# Patient Record
Sex: FEMALE | Race: WHITE | NOT HISPANIC OR LATINO | URBAN - METROPOLITAN AREA
[De-identification: names, ages, dates, MRNs, and addresses within clinical notes are randomized per-mention and may not be internally consistent; named-entity substitution may affect disease eponyms.]

---

## 2020-02-25 ENCOUNTER — EMERGENCY (EMERGENCY)
Facility: HOSPITAL | Age: 65
LOS: 0 days | Discharge: HOME | End: 2020-02-25
Attending: EMERGENCY MEDICINE
Payer: MEDICARE

## 2020-02-25 VITALS
RESPIRATION RATE: 22 BRPM | HEIGHT: 64 IN | DIASTOLIC BLOOD PRESSURE: 86 MMHG | WEIGHT: 130.07 LBS | OXYGEN SATURATION: 96 % | SYSTOLIC BLOOD PRESSURE: 161 MMHG | HEART RATE: 78 BPM | TEMPERATURE: 98 F

## 2020-02-25 VITALS — HEART RATE: 65 BPM | DIASTOLIC BLOOD PRESSURE: 62 MMHG | SYSTOLIC BLOOD PRESSURE: 132 MMHG

## 2020-02-25 DIAGNOSIS — I45.19 OTHER RIGHT BUNDLE-BRANCH BLOCK: ICD-10-CM

## 2020-02-25 DIAGNOSIS — R42 DIZZINESS AND GIDDINESS: ICD-10-CM

## 2020-02-25 LAB
ALBUMIN SERPL ELPH-MCNC: 3.4 G/DL — SIGNIFICANT CHANGE UP (ref 3.3–5)
ALP SERPL-CCNC: 98 U/L — SIGNIFICANT CHANGE UP (ref 40–120)
ALT FLD-CCNC: 21 U/L — SIGNIFICANT CHANGE UP (ref 12–78)
ANION GAP SERPL CALC-SCNC: 6 MMOL/L — SIGNIFICANT CHANGE UP (ref 5–17)
AST SERPL-CCNC: 20 U/L — SIGNIFICANT CHANGE UP (ref 15–37)
BILIRUB SERPL-MCNC: 0.3 MG/DL — SIGNIFICANT CHANGE UP (ref 0.2–1.2)
BUN SERPL-MCNC: 14 MG/DL — SIGNIFICANT CHANGE UP (ref 7–23)
CALCIUM SERPL-MCNC: 8.9 MG/DL — SIGNIFICANT CHANGE UP (ref 8.5–10.1)
CHLORIDE SERPL-SCNC: 113 MMOL/L — HIGH (ref 96–108)
CO2 SERPL-SCNC: 23 MMOL/L — SIGNIFICANT CHANGE UP (ref 22–31)
CREAT SERPL-MCNC: 0.97 MG/DL — SIGNIFICANT CHANGE UP (ref 0.5–1.3)
GLUCOSE SERPL-MCNC: 97 MG/DL — SIGNIFICANT CHANGE UP (ref 70–99)
HCT VFR BLD CALC: 41.9 % — SIGNIFICANT CHANGE UP (ref 34.5–45)
HGB BLD-MCNC: 13.5 G/DL — SIGNIFICANT CHANGE UP (ref 11.5–15.5)
MCHC RBC-ENTMCNC: 28.3 PG — SIGNIFICANT CHANGE UP (ref 27–34)
MCHC RBC-ENTMCNC: 32.2 GM/DL — SIGNIFICANT CHANGE UP (ref 32–36)
MCV RBC AUTO: 87.8 FL — SIGNIFICANT CHANGE UP (ref 80–100)
PLATELET # BLD AUTO: 196 K/UL — SIGNIFICANT CHANGE UP (ref 150–400)
POTASSIUM SERPL-MCNC: 4.1 MMOL/L — SIGNIFICANT CHANGE UP (ref 3.5–5.3)
POTASSIUM SERPL-SCNC: 4.1 MMOL/L — SIGNIFICANT CHANGE UP (ref 3.5–5.3)
PROT SERPL-MCNC: 7.4 GM/DL — SIGNIFICANT CHANGE UP (ref 6–8.3)
RBC # BLD: 4.77 M/UL — SIGNIFICANT CHANGE UP (ref 3.8–5.2)
RBC # FLD: 12.3 % — SIGNIFICANT CHANGE UP (ref 10.3–14.5)
SODIUM SERPL-SCNC: 142 MMOL/L — SIGNIFICANT CHANGE UP (ref 135–145)
TROPONIN I SERPL-MCNC: <0.015 NG/ML — SIGNIFICANT CHANGE UP (ref 0.01–0.04)
WBC # BLD: 8.09 K/UL — SIGNIFICANT CHANGE UP (ref 3.8–10.5)
WBC # FLD AUTO: 8.09 K/UL — SIGNIFICANT CHANGE UP (ref 3.8–10.5)

## 2020-02-25 PROCEDURE — 93005 ELECTROCARDIOGRAM TRACING: CPT

## 2020-02-25 PROCEDURE — 96360 HYDRATION IV INFUSION INIT: CPT

## 2020-02-25 PROCEDURE — 80053 COMPREHEN METABOLIC PANEL: CPT

## 2020-02-25 PROCEDURE — 84484 ASSAY OF TROPONIN QUANT: CPT

## 2020-02-25 PROCEDURE — 99284 EMERGENCY DEPT VISIT MOD MDM: CPT | Mod: 25

## 2020-02-25 PROCEDURE — 71045 X-RAY EXAM CHEST 1 VIEW: CPT | Mod: 26

## 2020-02-25 PROCEDURE — 93010 ELECTROCARDIOGRAM REPORT: CPT

## 2020-02-25 PROCEDURE — 85027 COMPLETE CBC AUTOMATED: CPT

## 2020-02-25 PROCEDURE — 36415 COLL VENOUS BLD VENIPUNCTURE: CPT

## 2020-02-25 PROCEDURE — 96361 HYDRATE IV INFUSION ADD-ON: CPT

## 2020-02-25 PROCEDURE — 71045 X-RAY EXAM CHEST 1 VIEW: CPT

## 2020-02-25 PROCEDURE — 99284 EMERGENCY DEPT VISIT MOD MDM: CPT

## 2020-02-25 RX ORDER — SODIUM CHLORIDE 9 MG/ML
1000 INJECTION INTRAMUSCULAR; INTRAVENOUS; SUBCUTANEOUS ONCE
Refills: 0 | Status: COMPLETED | OUTPATIENT
Start: 2020-02-25 | End: 2020-02-25

## 2020-02-25 RX ADMIN — SODIUM CHLORIDE 1000 MILLILITER(S): 9 INJECTION INTRAMUSCULAR; INTRAVENOUS; SUBCUTANEOUS at 10:55

## 2020-02-25 RX ADMIN — SODIUM CHLORIDE 1000 MILLILITER(S): 9 INJECTION INTRAMUSCULAR; INTRAVENOUS; SUBCUTANEOUS at 09:21

## 2020-02-25 NOTE — ED ADULT TRIAGE NOTE - CHIEF COMPLAINT QUOTE
pt a/ox3, BIBEMS from home as per EMS "someone called from pt house for pt feeling like the room is spinning and head is spinning. pt then states my heart is pounding. pt recently had a fight with someone in her family so her family thinks its stress from that". pt denies CP/sob, fever, chills, N/V/D.

## 2020-02-25 NOTE — ED PROVIDER NOTE - NEUROLOGICAL, MLM
Alert and oriented, no focal deficits, no motor or sensory deficits. Finger to nose normal. Ambulating with steady gait.

## 2020-02-25 NOTE — ED PROVIDER NOTE - OBJECTIVE STATEMENT
65 y/o female with no significant PMHx presents to the ED c/o dizziness x10 minutes this morning. Pt states she woke up this morning and felt like the room was spinning. Dizziness subsided 10 minutes after. Denies n/v, CP, SOB, numbness, tingling. Pt currently asymptomatic. No other complaints at this time.

## 2020-02-25 NOTE — ED PROVIDER NOTE - PATIENT PORTAL LINK FT
You can access the FollowMyHealth Patient Portal offered by [facility] by registering at the following website: [email or web address]. By joining [employer]’s FollowMyHealth portal, you will also be able to view your health information using other applications (apps) compatible with our system.

## 2020-02-25 NOTE — ED PROVIDER NOTE - CLINICAL SUMMARY MEDICAL DECISION MAKING FREE TEXT BOX
Possibly vertigo vs presyncopal episode. Given no neuro deficits, likely peripheral cause of vertigo. No central cause as pt has a benign exam and is currently asymptomatic. EKG nonischemic and with no evidence of underlying arrhythmia. Given no CP, pt evaluated with single troponin. Possibly vertigo vs presyncopal episode. Given no neuro deficits, likely peripheral cause of vertigo. Do not suspect central neuro etiology given symptom free and never with focal deficits.  Negative test of skew.  Pt remained asymptomatic throughout ed stay.  Denies cp/palpitations/sob.  Does not appear to have been syncopal event given timing that patient symptomatic.  EKG is unremarkable.    EKG nonischemic and with no evidence of underlying arrhythmia. Given no CP, pt evaluated with single troponin.  Pt would like to go home.  Stable for d/c at this time.  D/c home with strict return precautions and prompt outpatient f/u.

## 2020-02-25 NOTE — ED ADULT NURSE NOTE - OBJECTIVE STATEMENT
pt presents to ed via ems for dizziness since this am with associated "heart pounding "as per family, pt had argument with family member last night and thinks she is stressed from it. pt denies chest pain denies sob, talking in full sentences. denies dizziness at this time. ekg done

## 2021-01-28 ENCOUNTER — FORM ENCOUNTER (OUTPATIENT)
Age: 66
End: 2021-01-28

## 2021-01-29 ENCOUNTER — TRANSCRIPTION ENCOUNTER (OUTPATIENT)
Age: 66
End: 2021-01-29

## 2021-01-30 PROBLEM — Z00.00 ENCOUNTER FOR PREVENTIVE HEALTH EXAMINATION: Status: ACTIVE | Noted: 2021-01-30

## 2021-01-31 ENCOUNTER — TRANSCRIPTION ENCOUNTER (OUTPATIENT)
Age: 66
End: 2021-01-31

## 2021-01-31 ENCOUNTER — EMERGENCY (EMERGENCY)
Facility: HOSPITAL | Age: 66
LOS: 0 days | Discharge: HOME | End: 2021-01-31
Attending: EMERGENCY MEDICINE
Payer: MEDICARE

## 2021-01-31 VITALS
DIASTOLIC BLOOD PRESSURE: 79 MMHG | TEMPERATURE: 98 F | RESPIRATION RATE: 24 BRPM | SYSTOLIC BLOOD PRESSURE: 135 MMHG | OXYGEN SATURATION: 96 % | HEART RATE: 92 BPM

## 2021-01-31 VITALS — WEIGHT: 145.06 LBS | HEIGHT: 64 IN

## 2021-01-31 DIAGNOSIS — I45.10 UNSPECIFIED RIGHT BUNDLE-BRANCH BLOCK: ICD-10-CM

## 2021-01-31 DIAGNOSIS — B33.8 OTHER SPECIFIED VIRAL DISEASES: ICD-10-CM

## 2021-01-31 DIAGNOSIS — R05 COUGH: ICD-10-CM

## 2021-01-31 DIAGNOSIS — U07.1 COVID-19: ICD-10-CM

## 2021-01-31 DIAGNOSIS — R07.89 OTHER CHEST PAIN: ICD-10-CM

## 2021-01-31 DIAGNOSIS — R53.83 OTHER FATIGUE: ICD-10-CM

## 2021-01-31 DIAGNOSIS — R00.0 TACHYCARDIA, UNSPECIFIED: ICD-10-CM

## 2021-01-31 DIAGNOSIS — R06.02 SHORTNESS OF BREATH: ICD-10-CM

## 2021-01-31 LAB
ALBUMIN SERPL ELPH-MCNC: 3.2 G/DL — LOW (ref 3.3–5)
ALP SERPL-CCNC: 107 U/L — SIGNIFICANT CHANGE UP (ref 40–120)
ALT FLD-CCNC: 93 U/L — HIGH (ref 12–78)
ANION GAP SERPL CALC-SCNC: 5 MMOL/L — SIGNIFICANT CHANGE UP (ref 5–17)
AST SERPL-CCNC: 90 U/L — HIGH (ref 15–37)
BASOPHILS # BLD AUTO: 0.01 K/UL — SIGNIFICANT CHANGE UP (ref 0–0.2)
BASOPHILS NFR BLD AUTO: 0.2 % — SIGNIFICANT CHANGE UP (ref 0–2)
BILIRUB SERPL-MCNC: 0.4 MG/DL — SIGNIFICANT CHANGE UP (ref 0.2–1.2)
BUN SERPL-MCNC: 11 MG/DL — SIGNIFICANT CHANGE UP (ref 7–23)
CALCIUM SERPL-MCNC: 8.9 MG/DL — SIGNIFICANT CHANGE UP (ref 8.5–10.1)
CHLORIDE SERPL-SCNC: 107 MMOL/L — SIGNIFICANT CHANGE UP (ref 96–108)
CO2 SERPL-SCNC: 26 MMOL/L — SIGNIFICANT CHANGE UP (ref 22–31)
CREAT SERPL-MCNC: 0.83 MG/DL — SIGNIFICANT CHANGE UP (ref 0.5–1.3)
D DIMER BLD IA.RAPID-MCNC: <150 NG/ML DDU — SIGNIFICANT CHANGE UP
EOSINOPHIL # BLD AUTO: 0 K/UL — SIGNIFICANT CHANGE UP (ref 0–0.5)
EOSINOPHIL NFR BLD AUTO: 0 % — SIGNIFICANT CHANGE UP (ref 0–6)
FERRITIN SERPL-MCNC: 577 NG/ML — HIGH (ref 15–150)
GLUCOSE SERPL-MCNC: 126 MG/DL — HIGH (ref 70–99)
HCT VFR BLD CALC: 41.8 % — SIGNIFICANT CHANGE UP (ref 34.5–45)
HGB BLD-MCNC: 13.6 G/DL — SIGNIFICANT CHANGE UP (ref 11.5–15.5)
IMM GRANULOCYTES NFR BLD AUTO: 0.3 % — SIGNIFICANT CHANGE UP (ref 0–1.5)
LYMPHOCYTES # BLD AUTO: 0.58 K/UL — LOW (ref 1–3.3)
LYMPHOCYTES # BLD AUTO: 10.1 % — LOW (ref 13–44)
MCHC RBC-ENTMCNC: 28 PG — SIGNIFICANT CHANGE UP (ref 27–34)
MCHC RBC-ENTMCNC: 32.5 GM/DL — SIGNIFICANT CHANGE UP (ref 32–36)
MCV RBC AUTO: 86 FL — SIGNIFICANT CHANGE UP (ref 80–100)
MONOCYTES # BLD AUTO: 0.22 K/UL — SIGNIFICANT CHANGE UP (ref 0–0.9)
MONOCYTES NFR BLD AUTO: 3.8 % — SIGNIFICANT CHANGE UP (ref 2–14)
NEUTROPHILS # BLD AUTO: 4.92 K/UL — SIGNIFICANT CHANGE UP (ref 1.8–7.4)
NEUTROPHILS NFR BLD AUTO: 85.6 % — HIGH (ref 43–77)
PLATELET # BLD AUTO: 143 K/UL — LOW (ref 150–400)
POTASSIUM SERPL-MCNC: 3.9 MMOL/L — SIGNIFICANT CHANGE UP (ref 3.5–5.3)
POTASSIUM SERPL-SCNC: 3.9 MMOL/L — SIGNIFICANT CHANGE UP (ref 3.5–5.3)
PROT SERPL-MCNC: 8 GM/DL — SIGNIFICANT CHANGE UP (ref 6–8.3)
RBC # BLD: 4.86 M/UL — SIGNIFICANT CHANGE UP (ref 3.8–5.2)
RBC # FLD: 12.9 % — SIGNIFICANT CHANGE UP (ref 10.3–14.5)
SODIUM SERPL-SCNC: 138 MMOL/L — SIGNIFICANT CHANGE UP (ref 135–145)
TROPONIN I SERPL-MCNC: <0.015 NG/ML — SIGNIFICANT CHANGE UP (ref 0.01–0.04)
WBC # BLD: 5.75 K/UL — SIGNIFICANT CHANGE UP (ref 3.8–10.5)
WBC # FLD AUTO: 5.75 K/UL — SIGNIFICANT CHANGE UP (ref 3.8–10.5)

## 2021-01-31 PROCEDURE — 85025 COMPLETE CBC W/AUTO DIFF WBC: CPT

## 2021-01-31 PROCEDURE — 85379 FIBRIN DEGRADATION QUANT: CPT

## 2021-01-31 PROCEDURE — 84484 ASSAY OF TROPONIN QUANT: CPT

## 2021-01-31 PROCEDURE — 99285 EMERGENCY DEPT VISIT HI MDM: CPT | Mod: 25

## 2021-01-31 PROCEDURE — 71045 X-RAY EXAM CHEST 1 VIEW: CPT

## 2021-01-31 PROCEDURE — 71045 X-RAY EXAM CHEST 1 VIEW: CPT | Mod: 26

## 2021-01-31 PROCEDURE — 86140 C-REACTIVE PROTEIN: CPT

## 2021-01-31 PROCEDURE — 96375 TX/PRO/DX INJ NEW DRUG ADDON: CPT

## 2021-01-31 PROCEDURE — 99285 EMERGENCY DEPT VISIT HI MDM: CPT | Mod: CS

## 2021-01-31 PROCEDURE — 93010 ELECTROCARDIOGRAM REPORT: CPT

## 2021-01-31 PROCEDURE — 93005 ELECTROCARDIOGRAM TRACING: CPT

## 2021-01-31 PROCEDURE — 96374 THER/PROPH/DIAG INJ IV PUSH: CPT

## 2021-01-31 PROCEDURE — 36415 COLL VENOUS BLD VENIPUNCTURE: CPT

## 2021-01-31 PROCEDURE — 96361 HYDRATE IV INFUSION ADD-ON: CPT

## 2021-01-31 PROCEDURE — 82728 ASSAY OF FERRITIN: CPT

## 2021-01-31 PROCEDURE — 80053 COMPREHEN METABOLIC PANEL: CPT

## 2021-01-31 PROCEDURE — 84145 PROCALCITONIN (PCT): CPT

## 2021-01-31 PROCEDURE — 94640 AIRWAY INHALATION TREATMENT: CPT

## 2021-01-31 RX ORDER — HYDROCODONE BITARTRATE AND HOMATROPINE METHYLBROMIDE 5; 1.5 MG/5ML; MG/5ML
5 SOLUTION ORAL
Qty: 105 | Refills: 0
Start: 2021-01-31 | End: 2021-02-06

## 2021-01-31 RX ORDER — DEXAMETHASONE 0.5 MG/5ML
6 ELIXIR ORAL ONCE
Refills: 0 | Status: COMPLETED | OUTPATIENT
Start: 2021-01-31 | End: 2021-01-31

## 2021-01-31 RX ORDER — SODIUM CHLORIDE 9 MG/ML
500 INJECTION INTRAMUSCULAR; INTRAVENOUS; SUBCUTANEOUS ONCE
Refills: 0 | Status: COMPLETED | OUTPATIENT
Start: 2021-01-31 | End: 2021-01-31

## 2021-01-31 RX ORDER — ALBUTEROL 90 UG/1
1 AEROSOL, METERED ORAL ONCE
Refills: 0 | Status: COMPLETED | OUTPATIENT
Start: 2021-01-31 | End: 2021-01-31

## 2021-01-31 RX ORDER — ONDANSETRON 8 MG/1
4 TABLET, FILM COATED ORAL ONCE
Refills: 0 | Status: COMPLETED | OUTPATIENT
Start: 2021-01-31 | End: 2021-01-31

## 2021-01-31 RX ORDER — ACETAMINOPHEN 500 MG
1000 TABLET ORAL ONCE
Refills: 0 | Status: COMPLETED | OUTPATIENT
Start: 2021-01-31 | End: 2021-01-31

## 2021-01-31 RX ADMIN — ALBUTEROL 1 PUFF(S): 90 AEROSOL, METERED ORAL at 19:46

## 2021-01-31 RX ADMIN — Medication 1000 MILLIGRAM(S): at 19:46

## 2021-01-31 RX ADMIN — Medication 6 MILLIGRAM(S): at 19:46

## 2021-01-31 RX ADMIN — ONDANSETRON 4 MILLIGRAM(S): 8 TABLET, FILM COATED ORAL at 17:52

## 2021-01-31 RX ADMIN — SODIUM CHLORIDE 500 MILLILITER(S): 9 INJECTION INTRAMUSCULAR; INTRAVENOUS; SUBCUTANEOUS at 18:52

## 2021-01-31 RX ADMIN — SODIUM CHLORIDE 500 MILLILITER(S): 9 INJECTION INTRAMUSCULAR; INTRAVENOUS; SUBCUTANEOUS at 17:52

## 2021-01-31 NOTE — ED ADULT TRIAGE NOTE - CHIEF COMPLAINT QUOTE
Pt reports +covid dx with increasing pain and SOB with exertion. EKG in progress.  Pt speaks Ukrainian, but reports understanding English and prefers that to  phone. Pt reports +covid dx with increasing pain and SOB with exertion. EKG in progress.  Pt speaks Kinyarwanda, but reports understanding English and prefers that to  phone.  Son Hanny Lujan [phone], [phone]

## 2021-01-31 NOTE — ED PROVIDER NOTE - CARE PLAN
Principal Discharge DX:	COVID-19  Secondary Diagnosis:	Viral syndrome  Secondary Diagnosis:	Chest tightness  Secondary Diagnosis:	SOB (shortness of breath)

## 2021-01-31 NOTE — ED PROVIDER NOTE - CLINICAL SUMMARY MEDICAL DECISION MAKING FREE TEXT BOX
COVID x10 days. O2 sat at rest 98%, with exertion 97%. Will DC with strict return precautions. COVID x10 days. with chest tightness, cough, fatigue, sob. O2 sat at rest 98%, with exertion 97%. labs & cxr non actionable. Will DC with strict return precautions. pt has pulse ox at home. son also updated on results.

## 2021-01-31 NOTE — ED ADULT NURSE NOTE - PLAN OF CARE DISCUSSED WITH:
Next 5 appointments (look out 90 days)    Jul 25, 2019  7:30 AM CDT  PHYSICAL with AYANNA Bowser CNP  [facility] [address] [place] [id number]  [phone]   Oct 14, 2019  7:30 AM CDT  Return Visit with Kenny Arcos MD  [facility]) [address] [place] [id number]  [phone]           Patient

## 2021-01-31 NOTE — ED ADULT NURSE NOTE - ED CARDIAC RATE
[facility]  [address] [place] [address]  Tom KIDD [id number]  Phone: [phone]      August 19, 2019      [facility] Geovanna [facility]  [address] [facility] [address]      Parents of Geovanna,    Monitoring and managing your preventative and chronic health conditions are very important to us. Our records indicate that you are due for a well child check and immunization update.    If you have received your health care elsewhere, please call the clinic so the information can be documented in your chart.    Please call [phone] or message us through your [employer] account to schedule an appointment or provide information for your chart.     Feel free to contact us if you have any questions or concerns!    I look forward to seeing you and working with you on your health care needs.     Sincerely,       [facility]/ SUSAN          *If you have already scheduled an appointment, please disregard this reminder     tachycardic

## 2021-01-31 NOTE — ED PROVIDER NOTE - PROGRESS NOTE DETAILS
neelima: PT seen and reassessed.  Patient symptomatically improved.  Wants to be discharged AAOX3, NAD, VSS.  Discussed test results w/ patient. Patient verbalized understanding of hospital course and outpatient plans, has decisional making capacity.  Will f/u w/ pmd in the next few days; patient will call for an appointment. Will return to the ED if there is any worsening of symptoms.  Patient able to ambulate at baseline, is tolerating PO intake. Has safe way of getting home.

## 2021-01-31 NOTE — ED ADULT NURSE NOTE - CHIEF COMPLAINT QUOTE
Pt reports +covid dx with increasing pain and SOB with exertion. EKG in progress.  Pt speaks Romanian, but reports understanding English and prefers that to  phone.  Son Hanny Lujan [phone], [phone]

## 2021-01-31 NOTE — ED STATDOCS - PROGRESS NOTE DETAILS
Yaneth DO: Patient with known covid; sent from PMD for low o2 sat at office; patient with sob, chest pain, tachycardic; will send to ED for evaluation at this time.

## 2021-01-31 NOTE — ED ADULT TRIAGE NOTE - LANGUAGE ASSISTANCE NEEDED
pt prefers to speak english to triage rn/No-Patient/Caregiver offered and refused free interpretation services.

## 2021-01-31 NOTE — ED ADULT NURSE NOTE - OBJECTIVE STATEMENT
Patient complains of worsening shortness of breath and chest pain after testing positive for the covid ten days prior.  EKG done on arrival.   Cardiac and VS monitoring initiated.   20g PIV placed in RAC.

## 2021-01-31 NOTE — ED PROVIDER NOTE - NS ED ROS FT
Review of Systems:  	•	CONSTITUTIONAL: no fever  	•	SKIN: no rash  	•	RESPIRATORY: no shortness of breath  	•	CARDIAC: no chest pain  	•	GI:  no abd pain, no nausea, no vomiting, no diarrhea  	•	GENITO-URINARY:  no dysuria  	•	MUSCULOSKELETAL:  no back pain  	•	NEUROLOGIC: no weakness  	•	ALLERGY: no rhinorrhea  	•	PSYSCHIATRIC: appropriate concern about symptoms Review of Systems:  	•	CONSTITUTIONAL: no fever  	•	SKIN: no rash  	•	RESPIRATORY: +shortness of breath, +cough  	•	CARDIAC: +chest congestion, tightness  	•	GI:  no abd pain, no nausea, no vomiting, no diarrhea  	•	GENITO-URINARY:  no dysuria  	•	MUSCULOSKELETAL:  no back pain  	•	NEUROLOGIC: no weakness  	•	ALLERGY: no rhinorrhea  	•	PSYSCHIATRIC: appropriate concern about symptoms Review of Systems:  	•	CONSTITUTIONAL: fever  	•	SKIN: no rash  	•	RESPIRATORY: +shortness of breath, +cough  	•	CARDIAC: +chest congestion, tightness  	•	GI:  no abd pain, no nausea, no vomiting, no diarrhea  	•	GENITO-URINARY:  no dysuria  	•	MUSCULOSKELETAL:  no back pain  	•	NEUROLOGIC: no weakness  	•	ALLERGY: no rhinorrhea  	•	PSYSCHIATRIC: appropriate concern about symptoms

## 2021-01-31 NOTE — ED PROVIDER NOTE - OBJECTIVE STATEMENT
Pertinent HPI/PMH/PSH/FHx/SHx and Review of Systems contained within  HPI:  Patient p/w CC   x  days, new onset vs acute on chronic.   PMH/PSH relevant for: RBBB, COVID +  ROS negative for: fever, Chest pain, SOB, Nausea, vomiting, diarrhea, abdominal pain, dysuria    FamilyHx and SocialHx not otherwise contributory Pertinent HPI/PMH/PSH/FHx/SHx and Review of Systems contained within  HPI:  Patient p/w CC worsening chest congestion/tightness, SOB, cough, x10 days. Pt is COVID +. Home pulse ox showed O2 saturation 93-94%, called PMD, who recommended coming to ED for further evaluation. Pt speaks Faroese, which Dr. Gonzalez was able to translate.   PMH/PSH relevant for: RBBB, HTN, hypothyroidism   ROS negative for: fever, Nausea, vomiting, diarrhea, abdominal pain, dysuria    FamilyHx and SocialHx not otherwise contributory I,prasanth ortez, can speak Telugu fluently & served as interpretor for pt   HPI:  Patient p/w CC worsening chest congestion/tightness, cough, x10 days. also with mild sob & decreased appetitie. Pt is COVID +. Home pulse ox showed O2 saturation 93-94%, called PMD, who recommended coming to ED for further evaluation.   PMH/PSH relevant for: RBBB, HTN, hypothyroidism   ROS negative for: Nausea, vomiting, diarrhea, abdominal pain, dysuria    FamilyHx and SocialHx not otherwise contributory

## 2021-01-31 NOTE — ED PROVIDER NOTE - PATIENT PORTAL LINK FT
You can access the FollowMyHealth Patient Portal offered by [facility] by registering at the following website: [email or web address]. By joining [employer]’s FollowMyHealth portal, you will also be able to view your health information using other applications (apps) compatible with our system.

## 2021-01-31 NOTE — ED PROVIDER NOTE - NSFOLLOWUPINSTRUCTIONS_ED_ALL_ED_FT
For monoclonal antibody infusion inquiry, call [phone] or visit [email or web address] for outpatient treatment.     What is COVID-19?  Coronavirus disease 2019, or "COVID-19," is an infection caused by a specific virus called SARS-CoV-2. The virus first appeared in late 2019 in the city of [place], [place]. But it has spread quickly since then, and there are now cases in many other places, including [place] and the [place].    People with COVID-19 can have fever, cough, and trouble breathing. Problems with breathing happen when the infection affects the lungs and causes pneumonia (figure 1).    Experts are studying this virus and will continue to learn more about it over time.    How is COVID-19 spread?  COVID-19 mainly spreads from person to person, similar to the flu. This usually happens when a sick person coughs or sneezes near other people. Doctors also think it is possible to get sick if you touch a surface that has the virus on it and then touch your mouth, nose, or eyes.    From what experts know so far, COVID-19 seems to spread most easily when people are showing symptoms. It is possible to spread it without having symptoms, too, but experts don't know how often this happens.    What are the symptoms of COVID-19?  Symptoms usually start a few days after a person is infected with the virus. But in some people it can take even longer for symptoms to appear.    Symptoms can include:    ?Fever    ?Cough    ?Feeling tired    ?Trouble breathing    ?Muscle aches    Although it is less common, some people have other symptoms, such as headache, sore throat, runny nose, or problems with their sense of smell. Some have digestive problems like nausea or diarrhea.    Most people have mild symptoms. Some people have no symptoms at all. But in other people, COVID-19 can lead to serious problems like pneumonia, not getting enough oxygen, heart problems, or even death. This is more common in people who are older or have other health problems like heart disease, diabetes, lung disease, or cancer.    While children can get COVID-19, they seem less likely to have severe symptoms.    Should I see a doctor or nurse?  If you have a fever, cough, or trouble breathing and might have been exposed to COVID-19, call your doctor or nurse. You might have been exposed if any of the following happened within the last 14 days:    ?You had close contact with a person who has the virus – This generally means being within about 6 feet of the person.    ?You lived in, or traveled to, an area where lots of people have the virus – The [place] Centers for Disease Control and Prevention (CDC) has information about which areas are affected. This can be found on their website: www.cdc.gov/coronavirus/2019-ncov/travelers/index.html.    If your symptoms are not severe, it is best to call your doctor, nurse, or clinic before you go in. They can tell you what to do and whether you need to be seen in person. Many people with only mild symptoms can stay home, and away from other people, until they get better. If you do need to go to the clinic or hospital, you will need to put on a face mask. The staff might also have you wait someplace away from other people.    If you are severely ill and need to go to the clinic or hospital right away, you should still call ahead. This way the staff can care for you while taking steps to protect others.    Your doctor or nurse will do an exam and ask about your symptoms. They will also ask questions about any recent travel and whether you have been around anyone who might be sick.    Will I need tests?  If your doctor or nurse suspects you have COVID-19, they might take a sample of fluid from inside your nose, and possibly your mouth, and send it to a lab for testing. If you are coughing up mucus, they might also test a sample of the mucus. These tests can show if you have COVID-19 or another infection.    In some areas, it might not be possible to test everyone who might have been exposed to the virus. If your doctor cannot test you, they might tell you to stay home and away from other people, and call if your symptoms get worse.    Your doctor might also order a chest X-ray or computed tomography (CT) scan to check your lungs.    How is COVID-19 treated?  There is no specific treatment for COVID-19. Many people will be able to stay home while they get better, but people with serious symptoms or other health problems might need to go to the hospital:    ?Mild illness – Most people with COVID-19 have only mild illness and can rest at home until they get better. People with mild symptoms seem to get better after about 2 weeks, but it's not the same for everyone.    If you are recovering from COVID-19, it's important to stay home, and away from other people, until your doctor or nurse tells you it's safe to go back to your normal activities. This decision will depend on how long it has been since you had symptoms, and in some cases, whether you have had a negative test (showing that the virus is no longer in your body).    ?Severe illness – If you have more severe illness, you might need to stay in the hospital, possibly in the intensive care unit (also called the "ICU"). While you are there, you will most likely be in a special "isolation" room. Only medical staff will be allowed in the room, and they will have to wear special gowns, gloves, masks, and eye protection.    The doctors and nurses can monitor and support your breathing and other body functions and make you as comfortable as possible. You might need extra oxygen to help you breathe easily. If you are having a very hard time breathing, you might need to be put on a ventilator. This is a machine to help you breathe.    Doctors are studying several different medicines to learn whether they might work to treat COVID-19. In certain cases, doctors might recommend these medicines.    Can COVID-19 be prevented?  There are things you can do to reduce your chances of getting COVID-19. These steps are a good idea for everyone, especially as the infection is spreading very quickly. But they are extra important for people age 65 years or older or who have other health problems. To help slow the spread of infection:    ?Wash your hands with soap and water often. This is especially important after being in public and touching other people or surfaces. Make sure to rub your hands with soap for at least 20 seconds, cleaning your wrists, fingernails, and in between your fingers. Then rinse your hands and dry them with a paper towel you can throw away.    If you are not near a sink, you can use a hand gel to clean your hands. The gels with at least 60 percent alcohol work the best. But it is better to wash with soap and water if you can.    ?Avoid touching your face with your hands, especially your mouth, nose, or eyes.    ?Try to stay away from people who have any symptoms of the infection.    ?Avoid crowds. If you live in an area where there have been cases of COVID-19, try to stay home as much as you can.    Even if you are healthy, limiting contact with other people can help slow the spread of disease. Experts call this "social distancing." In general, the recommendation is to cancel or postpone large gatherings such as sports events, concerts, festivals, parades, and weddings. But even smaller gatherings can be risky. If you do need to be around other people, be sure to wash your hands often and avoid contact when you can. For example, you can avoid handshakes and high fives, and encourage others to do the same.    ?Some experts recommend avoiding travel to certain countries where there are a lot of cases of COVID-19. Travel recommendations are changing often.    Experts do not recommend wearing a face mask if you are not sick, unless you are caring for someone who has (or might have) COVID-19.    If someone in your home has COVID-19, there are additional things you can do to protect yourself and others:    ?Keep the sick person away from others – The sick person should stay in a separate room and use a separate bathroom if possible. They should also eat in their own room.    ?Use face masks – The sick person should wear a face mask when they are in the same room as other people. If you are caring for the sick person, you can also protect yourself by wearing a face mask when you are in the room. This is especially important if the sick person cannot wear a mask.    ?Wash hands – Wash your hands with soap and water often (see above).    ?Clean often – Here are some specific things that can help:    •Wear disposable gloves when you clean. It's also a good idea to wear gloves when you have to touch the sick person's laundry, dishes, utensils, or trash.    •Regularly clean things that are touched a lot. This includes counters, bedside tables, doorknobs, computers, phones, and bathroom surfaces.    •Clean things in your home with soap and water, but also use disinfectants on appropriate surfaces. Some cleaning products work well to kill bacteria, but not viruses, so it's important to check labels. The [place] Environmental Protection Agency (EPA) has a list of products here: www.epa.gov/pesticide-registration/list-n-disinfectants-use-against-sars-cov-2.    There is not yet a vaccine to prevent COVID-19.    What should I do if there is a COVID-19 outbreak in my area?  The best thing you can do to stay healthy is to wash your hands regularly, avoid close contact with people who are sick, and stay home if you are sick. In addition, to help slow the spread of disease, it's important to follow any official instructions in your area about limiting contact with other people. Even if there are no cases of COVID-19 where you live, that could change in the future.    When a lot of cases of COVID-19 spread through one area, experts call this "community transmission." When this happens, schools or businesses in the area will likely close temporarily, and many events will be canceled. City and state leaders might also tell people to stay at home for some time. There are things you can do to prepare for this. For example, you might be able to work from home. You can also make sure you have a way to get in touch with relatives, neighbors, and others in your area. This way you will be able to receive and share information easily.    Rules and guidelines might be different in different areas. If officials do tell people in your area to stay home or avoid gathering with other people, it's important to take this seriously and follow instructions as best you can. Even if you are not at high risk of getting very sick from COVID-19, you could still pass it along to others. Keeping people away from each other is one of the best ways to control the spread of the virus.    What can I do to cope with stress and anxiety?  It is normal to feel anxious or worried about COVID-19. You can take care of yourself, and your family, by trying to:    ?Take breaks from the news    ?Get regular exercise and eat healthy foods    ?Try to find activities that you enjoy and can do in your home    ?Stay in touch with your friends and family members    Keep in mind that most people do not get severely ill or die from COVID-19. While it helps to be prepared, and it's important to do what you can to lower your risk and help slow the spread of the virus, try not to panic.    Where can I go to learn more?  As we learn more about this virus, expert recommendations will continue to change. Check with your doctor or public health official to get the most updated information about how to protect yourself.    You can also find more information about COVID-19 at the following websites:    ?[place] Centers for Disease Control and Prevention (CDC): www.cdc.gov/COVID19    ?World Health Organization (WHO): www.who.int/emergencies/diseases/novel-coronavirus-2019

## 2021-02-01 LAB
CRP SERPL-MCNC: 2.88 MG/DL — HIGH (ref 0–0.4)
PROCALCITONIN SERPL-MCNC: 0.11 NG/ML — HIGH (ref 0.02–0.1)

## 2021-02-02 ENCOUNTER — APPOINTMENT (OUTPATIENT)
Dept: DISASTER EMERGENCY | Facility: HOSPITAL | Age: 66
End: 2021-02-02

## 2021-02-05 ENCOUNTER — EMERGENCY (EMERGENCY)
Facility: HOSPITAL | Age: 66
LOS: 0 days | Discharge: HOME | End: 2021-02-05
Attending: HOSPITALIST
Payer: MEDICARE

## 2021-02-05 VITALS
TEMPERATURE: 99 F | RESPIRATION RATE: 18 BRPM | HEART RATE: 95 BPM | DIASTOLIC BLOOD PRESSURE: 107 MMHG | SYSTOLIC BLOOD PRESSURE: 124 MMHG | OXYGEN SATURATION: 96 %

## 2021-02-05 VITALS — HEIGHT: 64 IN

## 2021-02-05 DIAGNOSIS — R06.02 SHORTNESS OF BREATH: ICD-10-CM

## 2021-02-05 DIAGNOSIS — E03.9 HYPOTHYROIDISM, UNSPECIFIED: ICD-10-CM

## 2021-02-05 DIAGNOSIS — R50.9 FEVER, UNSPECIFIED: ICD-10-CM

## 2021-02-05 DIAGNOSIS — I10 ESSENTIAL (PRIMARY) HYPERTENSION: ICD-10-CM

## 2021-02-05 DIAGNOSIS — R05 COUGH: ICD-10-CM

## 2021-02-05 DIAGNOSIS — U07.1 COVID-19: ICD-10-CM

## 2021-02-05 DIAGNOSIS — Z60.4 SOCIAL EXCLUSION AND REJECTION: ICD-10-CM

## 2021-02-05 LAB
ALBUMIN SERPL ELPH-MCNC: 2.9 G/DL — LOW (ref 3.3–5)
ALP SERPL-CCNC: 106 U/L — SIGNIFICANT CHANGE UP (ref 40–120)
ALT FLD-CCNC: 121 U/L — HIGH (ref 12–78)
ANION GAP SERPL CALC-SCNC: 6 MMOL/L — SIGNIFICANT CHANGE UP (ref 5–17)
APTT BLD: 25.9 SEC — LOW (ref 27.5–35.5)
AST SERPL-CCNC: 55 U/L — HIGH (ref 15–37)
BASOPHILS # BLD AUTO: 0 K/UL — SIGNIFICANT CHANGE UP (ref 0–0.2)
BASOPHILS NFR BLD AUTO: 0 % — SIGNIFICANT CHANGE UP (ref 0–2)
BILIRUB SERPL-MCNC: 0.6 MG/DL — SIGNIFICANT CHANGE UP (ref 0.2–1.2)
BUN SERPL-MCNC: 18 MG/DL — SIGNIFICANT CHANGE UP (ref 7–23)
CALCIUM SERPL-MCNC: 8.7 MG/DL — SIGNIFICANT CHANGE UP (ref 8.5–10.1)
CHLORIDE SERPL-SCNC: 107 MMOL/L — SIGNIFICANT CHANGE UP (ref 96–108)
CK SERPL-CCNC: 57 U/L — SIGNIFICANT CHANGE UP (ref 26–192)
CO2 SERPL-SCNC: 24 MMOL/L — SIGNIFICANT CHANGE UP (ref 22–31)
CREAT SERPL-MCNC: 0.83 MG/DL — SIGNIFICANT CHANGE UP (ref 0.5–1.3)
D DIMER BLD IA.RAPID-MCNC: <150 NG/ML DDU — SIGNIFICANT CHANGE UP
EOSINOPHIL # BLD AUTO: 0.11 K/UL — SIGNIFICANT CHANGE UP (ref 0–0.5)
EOSINOPHIL NFR BLD AUTO: 1 % — SIGNIFICANT CHANGE UP (ref 0–6)
GLUCOSE SERPL-MCNC: 104 MG/DL — HIGH (ref 70–99)
HCT VFR BLD CALC: 43.7 % — SIGNIFICANT CHANGE UP (ref 34.5–45)
HGB BLD-MCNC: 13.9 G/DL — SIGNIFICANT CHANGE UP (ref 11.5–15.5)
INR BLD: 1 RATIO — SIGNIFICANT CHANGE UP (ref 0.88–1.16)
LACTATE SERPL-SCNC: 0.9 MMOL/L — SIGNIFICANT CHANGE UP (ref 0.7–2)
LYMPHOCYTES # BLD AUTO: 1.95 K/UL — SIGNIFICANT CHANGE UP (ref 1–3.3)
LYMPHOCYTES # BLD AUTO: 18 % — SIGNIFICANT CHANGE UP (ref 13–44)
MCHC RBC-ENTMCNC: 27.5 PG — SIGNIFICANT CHANGE UP (ref 27–34)
MCHC RBC-ENTMCNC: 31.8 GM/DL — LOW (ref 32–36)
MCV RBC AUTO: 86.5 FL — SIGNIFICANT CHANGE UP (ref 80–100)
MONOCYTES # BLD AUTO: 1.73 K/UL — HIGH (ref 0–0.9)
MONOCYTES NFR BLD AUTO: 16 % — HIGH (ref 2–14)
NEUTROPHILS # BLD AUTO: 7.03 K/UL — SIGNIFICANT CHANGE UP (ref 1.8–7.4)
NEUTROPHILS NFR BLD AUTO: 65 % — SIGNIFICANT CHANGE UP (ref 43–77)
NRBC # BLD: SIGNIFICANT CHANGE UP /100 WBCS (ref 0–0)
PLATELET # BLD AUTO: 327 K/UL — SIGNIFICANT CHANGE UP (ref 150–400)
POTASSIUM SERPL-MCNC: 4.2 MMOL/L — SIGNIFICANT CHANGE UP (ref 3.5–5.3)
POTASSIUM SERPL-SCNC: 4.2 MMOL/L — SIGNIFICANT CHANGE UP (ref 3.5–5.3)
PROT SERPL-MCNC: 7.3 GM/DL — SIGNIFICANT CHANGE UP (ref 6–8.3)
PROTHROM AB SERPL-ACNC: 11.6 SEC — SIGNIFICANT CHANGE UP (ref 10.6–13.6)
RBC # BLD: 5.05 M/UL — SIGNIFICANT CHANGE UP (ref 3.8–5.2)
RBC # FLD: 13.1 % — SIGNIFICANT CHANGE UP (ref 10.3–14.5)
SODIUM SERPL-SCNC: 137 MMOL/L — SIGNIFICANT CHANGE UP (ref 135–145)
WBC # BLD: 10.82 K/UL — HIGH (ref 3.8–10.5)
WBC # FLD AUTO: 10.82 K/UL — HIGH (ref 3.8–10.5)

## 2021-02-05 PROCEDURE — 93010 ELECTROCARDIOGRAM REPORT: CPT

## 2021-02-05 PROCEDURE — 80053 COMPREHEN METABOLIC PANEL: CPT

## 2021-02-05 PROCEDURE — 99285 EMERGENCY DEPT VISIT HI MDM: CPT | Mod: 25

## 2021-02-05 PROCEDURE — 82728 ASSAY OF FERRITIN: CPT

## 2021-02-05 PROCEDURE — 71045 X-RAY EXAM CHEST 1 VIEW: CPT

## 2021-02-05 PROCEDURE — 99284 EMERGENCY DEPT VISIT MOD MDM: CPT | Mod: CS

## 2021-02-05 PROCEDURE — 71045 X-RAY EXAM CHEST 1 VIEW: CPT | Mod: 26

## 2021-02-05 PROCEDURE — 86140 C-REACTIVE PROTEIN: CPT

## 2021-02-05 PROCEDURE — 85610 PROTHROMBIN TIME: CPT

## 2021-02-05 PROCEDURE — 85025 COMPLETE CBC W/AUTO DIFF WBC: CPT

## 2021-02-05 PROCEDURE — 82550 ASSAY OF CK (CPK): CPT

## 2021-02-05 PROCEDURE — 85379 FIBRIN DEGRADATION QUANT: CPT

## 2021-02-05 PROCEDURE — 93005 ELECTROCARDIOGRAM TRACING: CPT

## 2021-02-05 PROCEDURE — 85730 THROMBOPLASTIN TIME PARTIAL: CPT

## 2021-02-05 PROCEDURE — 36415 COLL VENOUS BLD VENIPUNCTURE: CPT

## 2021-02-05 PROCEDURE — 83605 ASSAY OF LACTIC ACID: CPT

## 2021-02-05 PROCEDURE — 84145 PROCALCITONIN (PCT): CPT

## 2021-02-05 PROCEDURE — 96374 THER/PROPH/DIAG INJ IV PUSH: CPT

## 2021-02-05 RX ORDER — DEXAMETHASONE 0.5 MG/5ML
6 ELIXIR ORAL ONCE
Refills: 0 | Status: COMPLETED | OUTPATIENT
Start: 2021-02-05 | End: 2021-02-05

## 2021-02-05 RX ADMIN — Medication 6 MILLIGRAM(S): at 23:00

## 2021-02-05 SDOH — SOCIAL STABILITY - SOCIAL INSECURITY: SOCIAL EXCLUSION AND REJECTION: Z60.4

## 2021-02-05 NOTE — ED PROVIDER NOTE - NS_ ATTENDINGSCRIBEDETAILS _ED_A_ED_FT
Chloe Gamino MD: The history, relevant review of systems, past medical and surgical history, medical decision making, and physical examination was documented by the scribe in my presence and I attest to the accuracy of the documentation.

## 2021-02-05 NOTE — ED PROVIDER NOTE - CPE EDP CARDIAC NORM
Vaccine Information Statement(s) was given today. This has been reviewed, questions answered, and verbal consent given by Parent for injection(s) and administration of Hepatitis A.        Patient tolerated without incident. See immunization grid for documentation.   normal...

## 2021-02-05 NOTE — ED ADULT NURSE NOTE - OBJECTIVE STATEMENT
65F hx htn hypothroid presents to the ED with SOB. dx with covid 1/21 now with worsening sob. here pt hypoxic to 89/90% RA. will send to main for further workup Arcenio ALCALA

## 2021-02-05 NOTE — ED ADULT NURSE NOTE - SUICIDE SCREENING DEPRESSION
[FreeTextEntry1] : 36 year old male presents for follow-up \par \par states sinus congestion is greatly improved after septal surgery several months ago.  \par \par still feels anxiety at times with episodes of dizziness, headaches followed by palpitations.  lasts for about 5 mins then resolves on its own.  at times occurs in the car or while he is in the middle of multi tasking.  \par +snoring at night , endorses having to take naps at work and getting up in the middle of the night for no reason.  \par no other acute complaints \par -advised can be related to anxiety therefore will give xanax as needed for panic attacks, risks and benefits of medication discussed in detail.\par -urged to f/u with sleep specialist to r/o [place] gien +stop questionaire, body habitus \par -urged weight loss through Healthy diet, such as Mediterranean Diet and Exercise, such as walking >20 minutes a day and increasing gradually as tolerated\par \par also was dxed with H. pylori and treated with triple therapy.  feels ok, but still having reflux.  was using his mothers pantoprazole which he feels works better than his omeprazole.  \par has f/u appt with GI this month to repeat urea breath test.  \par -will switch to pantoprazole \par -urged to f/u as possible hpylori not fully cleared \par \par \par \par 
Negative

## 2021-02-05 NOTE — ED PROVIDER NOTE - OBJECTIVE STATEMENT
64 y/o F with PMHx of HTN, hypothyroidism presenting to the ED c/o SOB, cough, body aches, fever, decreased PO intake x2 weeks. Patient was COVID+ on Jan 21st, presents to the ED with worsening SOB. Exacerbated with exertion. Denies any CP, abd pain, N/V/D.

## 2021-02-05 NOTE — ED STATDOCS - PROGRESS NOTE DETAILS
65F hx htn hypothroid presents to the ED with SOB. dx with covid 1/21 now with worsening sob. here pt hypoxic to 89/90% RA. will send to main for further workup Arcenio Voss M.D., Attending Physician

## 2021-02-05 NOTE — ED PROVIDER NOTE - CLINICAL SUMMARY MEDICAL DECISION MAKING FREE TEXT BOX
65 F with COVID+ x2 weeks, mildly hypoxic with ambulation. Will obtain labs. fluids, CXR and reassess

## 2021-02-05 NOTE — ED ADULT NURSE REASSESSMENT NOTE - NS ED NURSE REASSESS COMMENT FT1
handoff report taken from day RN Caity. pt. noted resting comfortably in stretcher. VSS, on RA, COVID precautions maintained. medications given. No distress noted, denies pain, safety maintained. WCTM   warm blankets provided

## 2021-02-05 NOTE — ED ADULT TRIAGE NOTE - CHIEF COMPLAINT QUOTE
Ambulatory from home complaining of hypoxia based on home pulse ox. Diagnosed COVID + 1/21 and has had difficulty breathing today. Patient in NAD, calm and cooperative, speaking in full sentences to daughter. Armenian speaking,  services offered and refused. EKG in progress. Ambulatory from home complaining of hypoxia (89 on RA) based on home pulse ox. Diagnosed COVID + 1/21 and has had difficulty breathing today. Patient in NAD, calm and cooperative, speaking in full sentences to daughter. Albanian speaking,  services offered and refused. EKG in progress.

## 2021-02-05 NOTE — ED PROVIDER NOTE - NSFOLLOWUPINSTRUCTIONS_ED_ALL_ED_FT
Take tylenol as needed for pain, 650Mg every 6-8 hours.  You can also take ibuprofen as needed for pain, 600mg every 6-8 hours, take with food.  return for any worsening breathing or if your oxygen level is below 90%

## 2021-02-05 NOTE — ED ADULT NURSE NOTE - CHIEF COMPLAINT QUOTE
Ambulatory from home complaining of hypoxia (89 on RA) based on home pulse ox. Diagnosed COVID + 1/21 and has had difficulty breathing today. Patient in NAD, calm and cooperative, speaking in full sentences to daughter. Serbian speaking,  services offered and refused. EKG in progress.

## 2021-02-05 NOTE — ED PROVIDER NOTE - PATIENT PORTAL LINK FT
You can access the FollowMyHealth Patient Portal offered by [facility] by registering at the following website: [email or web address]. By joining [facility]’s FollowMyHealth portal, you will also be able to view your health information using other applications (apps) compatible with our system.

## 2021-02-06 LAB
CRP SERPL-MCNC: 7.08 MG/DL — HIGH (ref 0–0.4)
FERRITIN SERPL-MCNC: 506 NG/ML — HIGH (ref 15–150)
PROCALCITONIN SERPL-MCNC: 0.05 NG/ML — SIGNIFICANT CHANGE UP (ref 0.02–0.1)

## 2021-02-07 NOTE — ED POST DISCHARGE NOTE - DETAILS
left message for call back states she feels better aware of elevated markers will fu with her pmd and return to ed for any worsening of symptoms. LELA HYDE

## 2022-01-25 NOTE — ED ADULT NURSE NOTE - PAIN RATING/NUMBER SCALE (0-10): ACTIVITY
0 11 yr old with history of mood disorder on Risperdal, and now  comes with SI and a plan, plan included hanging herself in the bathroom, and if not successful wanted to cut her throat. feels "Bad" and no voices and + superficial cutting with razor at home. Has psychiatrist at home.

## 2022-11-07 NOTE — ED ADULT TRIAGE NOTE - MEANS OF ARRIVAL
Received patient at beginning of shift. Patient visible in milieu, attending groups, and social with peers. Patient states he had \"a good weekend\" on the unit. Patient signed a 5 day on Friday 11/4. Two sutures on patient's left wrist were removed today. Affect is constricted with reduced expression. Patient presents with anxious mood. Patient denies suicidal or homicidal ideations. Denies auditory or visual hallucinations. Patient is calm and cooperative.     COVID 19 screening has been completed and no concerns at this time. Pt compliant with masking and social distancing.     Psychotropic medication compliance: yes    Psychotropic medication side effects: none     Psychotropic PRNs given: Atarax @ 1402    Appetite and food intake: good     Sleep hours (if applicable):      ambulatory

## 2023-03-14 RX ORDER — SODIUM CHLORIDE 9 MG/ML
1000 INJECTION, SOLUTION INTRAVENOUS
Refills: 0 | Status: DISCONTINUED | OUTPATIENT
Start: 2023-03-15 | End: 2023-03-15

## 2023-03-14 NOTE — ASU PATIENT PROFILE, ADULT - NS PREOP UNDERSTANDS INFO
leave valuables/jewelry/piercings/contacts at home, make sure to have escort 18+, bring photo ID + insurance card, no solid foods after midnight, water/apple juice/gatorade until 0730/yes

## 2023-03-14 NOTE — ASU PATIENT PROFILE, ADULT - VISION (WITH CORRECTIVE LENSES IF THE PATIENT USUALLY WEARS THEM):
The patient is a 75y Female complaining of palpitations.
Normal vision: sees adequately in most situations; can see medication labels, newsprint

## 2023-03-15 ENCOUNTER — OUTPATIENT (OUTPATIENT)
Dept: OUTPATIENT SERVICES | Facility: HOSPITAL | Age: 68
LOS: 1 days | Discharge: HOME | End: 2023-03-15

## 2023-03-15 VITALS
HEART RATE: 66 BPM | SYSTOLIC BLOOD PRESSURE: 122 MMHG | RESPIRATION RATE: 16 BRPM | DIASTOLIC BLOOD PRESSURE: 61 MMHG | OXYGEN SATURATION: 99 %

## 2023-03-15 VITALS
OXYGEN SATURATION: 98 % | HEIGHT: 64 IN | SYSTOLIC BLOOD PRESSURE: 134 MMHG | RESPIRATION RATE: 16 BRPM | TEMPERATURE: 98 F | DIASTOLIC BLOOD PRESSURE: 61 MMHG | WEIGHT: 138.89 LBS | HEART RATE: 76 BPM

## 2023-03-15 DIAGNOSIS — Z90.49 ACQUIRED ABSENCE OF OTHER SPECIFIED PARTS OF DIGESTIVE TRACT: Chronic | ICD-10-CM

## 2023-03-15 DEVICE — IMPLANTABLE DEVICE
Type: IMPLANTABLE DEVICE | Site: RIGHT | Status: NON-FUNCTIONAL
Removed: 2023-03-15

## 2023-03-15 RX ORDER — KETOROLAC TROMETHAMINE 0.5 %
1 DROPS OPHTHALMIC (EYE)
Refills: 0 | Status: COMPLETED | OUTPATIENT
Start: 2023-03-15 | End: 2023-03-15

## 2023-03-15 RX ORDER — PHENYLEPHRINE HCL 2.5 %
1 DROPS OPHTHALMIC (EYE)
Refills: 0 | Status: COMPLETED | OUTPATIENT
Start: 2023-03-15 | End: 2023-03-15

## 2023-03-15 RX ORDER — TROPICAMIDE 1 %
1 DROPS OPHTHALMIC (EYE)
Refills: 0 | Status: COMPLETED | OUTPATIENT
Start: 2023-03-15 | End: 2023-03-15

## 2023-03-15 RX ORDER — OFLOXACIN 0.3 %
1 DROPS OPHTHALMIC (EYE)
Refills: 0 | Status: COMPLETED | OUTPATIENT
Start: 2023-03-15 | End: 2023-03-15

## 2023-03-15 RX ORDER — CYCLOPENTOLATE HYDROCHLORIDE 10 MG/ML
1 SOLUTION/ DROPS OPHTHALMIC
Refills: 0 | Status: COMPLETED | OUTPATIENT
Start: 2023-03-15 | End: 2023-03-15

## 2023-03-15 RX ORDER — ACETAMINOPHEN 500 MG
650 TABLET ORAL ONCE
Refills: 0 | Status: DISCONTINUED | OUTPATIENT
Start: 2023-03-15 | End: 2023-03-15

## 2023-03-15 RX ADMIN — Medication 1 DROP(S): at 09:47

## 2023-03-15 RX ADMIN — CYCLOPENTOLATE HYDROCHLORIDE 1 DROP(S): 10 SOLUTION/ DROPS OPHTHALMIC at 09:47

## 2023-03-15 RX ADMIN — Medication 1 DROP(S): at 09:42

## 2023-03-15 RX ADMIN — Medication 1 DROP(S): at 09:37

## 2023-03-15 RX ADMIN — CYCLOPENTOLATE HYDROCHLORIDE 1 DROP(S): 10 SOLUTION/ DROPS OPHTHALMIC at 09:37

## 2023-03-15 RX ADMIN — CYCLOPENTOLATE HYDROCHLORIDE 1 DROP(S): 10 SOLUTION/ DROPS OPHTHALMIC at 09:42

## 2023-03-15 NOTE — OPERATIVE REPORT - OPERATIVE RPOSRT DETAILS
PROCEDURE DATE:    SURGEON: ANTHONY TSANG MD     ANESTHESIA:  MAC.    PREOPERATIVE DIAGNOSIS(ES):  Cataract, Right eye.    POSTOPERATIVE DIAGNOSIS(ES):  Cataract,right eye.    OPERATION:  Cataract extraction with intraocular lens insertion, right eye.    COMPLICATIONS:  None.    SPECIMENS:  None.    ESTIMATED BLOOD LOSS: <1 cc    DESCRIPTION OF PROCEDURE:  The patient was identified in the ASU.  The risks, benefits, and alternatives to surgery were discussed with the patient at length.  Informed consent was obtained.  The right eye was identified and marked.  The patient was then brought to the operating room and placed in the supine position.  The right eye was prepped and draped in the usual sterile fashion for intraocular surgery.  An eyelid speculum was then placed underneath the right eye.    A sideport blade was used to create a paracentesis.  Preservativefree 1% lidocaine was injected into the anterior chamber, followed by trypan blue which was irrigated out using PF epinephrine, followed by Viscoat.  A 2.4 keratome was used to create a temporal clear corneal incision.  A cystotome was used to begin a continuous curvilinear capsulorrhexis, which was finished with Utrata forceps.  Hydrodissection was done with BSS, and the lens was noted to rotate freely in the capsular bag.    Phacoemulsification was accomplished using the divide-and-conquer technique without complications.  Residual cortical material was removed using singlehandpiece polymer tipped coaxial irrigation and aspiration.  Healon was then injected into the capsular bag.  The DIB00 11.0 diopter lens was implanted into the capsular bag and rotated into proper position using a Kuglen hook.  Irrigation and aspiration was used to remove any residual cortical material and viscoelastic.   All wounds were hydrated and found to be watertight.  The lens was centered, and the anterior chamber was deep.  The intraocular pressure at the end of the case was within physiological limits.  No complications were noted.    Topical antibiotics and steroids and NSAIDs were applied to the surface of the right eye.  The eyelid speculum was removed.  The eye was shielded.  The patient tolerated the procedure well and was brought to the postoperative care unit in stable condition.

## 2023-03-15 NOTE — PRE-ANESTHESIA EVALUATION ADULT - NSANTHDISPORD_GEN_ALL_CORE
PACU Vtama Pregnancy And Lactation Text: It is unknown if this medication can cause problems during pregnancy and breastfeeding.

## 2023-09-18 PROBLEM — I10 ESSENTIAL (PRIMARY) HYPERTENSION: Chronic | Status: ACTIVE | Noted: 2023-03-14

## 2023-09-18 PROBLEM — E03.9 HYPOTHYROIDISM, UNSPECIFIED: Chronic | Status: ACTIVE | Noted: 2023-03-14

## 2023-09-19 ENCOUNTER — TRANSCRIPTION ENCOUNTER (OUTPATIENT)
Age: 68
End: 2023-09-19

## 2023-09-20 ENCOUNTER — OUTPATIENT (OUTPATIENT)
Dept: OUTPATIENT SERVICES | Facility: HOSPITAL | Age: 68
LOS: 1 days | End: 2023-09-20
Payer: COMMERCIAL

## 2023-09-20 ENCOUNTER — TRANSCRIPTION ENCOUNTER (OUTPATIENT)
Age: 68
End: 2023-09-20

## 2023-09-20 VITALS
SYSTOLIC BLOOD PRESSURE: 126 MMHG | OXYGEN SATURATION: 98 % | HEART RATE: 65 BPM | RESPIRATION RATE: 18 BRPM | DIASTOLIC BLOOD PRESSURE: 74 MMHG | TEMPERATURE: 98 F

## 2023-09-20 VITALS
OXYGEN SATURATION: 95 % | HEART RATE: 72 BPM | SYSTOLIC BLOOD PRESSURE: 149 MMHG | HEIGHT: 61.25 IN | DIASTOLIC BLOOD PRESSURE: 67 MMHG | RESPIRATION RATE: 20 BRPM | WEIGHT: 138.89 LBS | TEMPERATURE: 98 F

## 2023-09-20 DIAGNOSIS — Z98.49 CATARACT EXTRACTION STATUS, UNSPECIFIED EYE: Chronic | ICD-10-CM

## 2023-09-20 DIAGNOSIS — Z90.49 ACQUIRED ABSENCE OF OTHER SPECIFIED PARTS OF DIGESTIVE TRACT: Chronic | ICD-10-CM

## 2023-09-20 DIAGNOSIS — H33.021 RETINAL DETACHMENT WITH MULTIPLE BREAKS, RIGHT EYE: ICD-10-CM

## 2023-09-20 PROCEDURE — C1889: CPT

## 2023-09-20 PROCEDURE — 67108 REPAIR DETACHED RETINA: CPT | Mod: RT

## 2023-09-20 PROCEDURE — 67108 REPAIR DETACHED RETINA: CPT | Mod: AS

## 2023-09-20 DEVICE — SLEEVE OVAL STYLE S3083: Type: IMPLANTABLE DEVICE | Site: RIGHT | Status: FUNCTIONAL

## 2023-09-20 DEVICE — LASER PROBE 23G CONSTELLATION: Type: IMPLANTABLE DEVICE | Site: RIGHT | Status: FUNCTIONAL

## 2023-09-20 DEVICE — GS C3F8 PERFLUOROPROPANE IOL 2.5 L 20GM: Type: IMPLANTABLE DEVICE | Site: RIGHT | Status: FUNCTIONAL

## 2023-09-20 DEVICE — STRIP SILICONE STYLE 41: Type: IMPLANTABLE DEVICE | Site: RIGHT | Status: FUNCTIONAL

## 2023-09-20 RX ORDER — LEVOTHYROXINE SODIUM 125 MCG
1 TABLET ORAL
Qty: 0 | Refills: 0 | DISCHARGE

## 2023-09-20 RX ORDER — LOSARTAN POTASSIUM 100 MG/1
100 TABLET, FILM COATED ORAL
Qty: 0 | Refills: 0 | DISCHARGE

## 2023-09-20 RX ORDER — AMLODIPINE BESYLATE 2.5 MG/1
1 TABLET ORAL
Qty: 0 | Refills: 0 | DISCHARGE

## 2023-09-20 NOTE — ASU PATIENT PROFILE, ADULT - NSICDXPASTMEDICALHX_GEN_ALL_CORE_FT
PAST MEDICAL HISTORY:  Asymptomatic varicose veins of both lower extremities     Dry eye syndrome of bilateral lacrimal glands     GERD (gastroesophageal reflux disease)     H/O allergic rhinitis     H/O polyneuropathy     H/O urinary incontinence     H/O vitamin D deficiency     History of dyspnea     HTN (hypertension)     Hypothyroid     Nontoxic single thyroid nodule     Other atherosclerosis of native artery of extremity     Prediabetes

## 2023-09-20 NOTE — ASU DISCHARGE PLAN (ADULT/PEDIATRIC) - CARE PROVIDER_API CALL
Lalo Ocasio  Ophthalmology  [facility] [facility], [facility] [address]  [place] [address]  Phone: [phone]  Fax: [phone]  Scheduled Appointment: 09/21/2023 02:00 PM

## 2023-09-20 NOTE — ASU PATIENT PROFILE, ADULT - NS PREOP UNDERSTANDS INFO
leave valuables /jewelry /Piercing /contacts at home, make sure to have escort 18+, bring photo ID + insurance card, no solid foods after midnight, water/apple juice/gatorade until 0730/yes

## 2023-09-20 NOTE — ASU DISCHARGE PLAN (ADULT/PEDIATRIC) - NS MD DC FALL RISK RISK
For information on Fall & Injury Prevention, visit: [email or web address] OR  [email or web address] OR  https://www.cdc.gov/steadi/patient.html

## 2023-09-20 NOTE — ASU DISCHARGE PLAN (ADULT/PEDIATRIC) - ASU DC SPECIAL INSTRUCTIONSFT
face down even during sleep but may sleep on your left side with ear on the pillow if face down not possible

## 2023-09-20 NOTE — ASU PREOP CHECKLIST - NS PREOP CHK MONITOR ANESTHESIA CONSENT
Quality 110: Preventive Care And Screening: Influenza Immunization: Influenza Immunization Administered during Influenza season Quality 111:Pneumonia Vaccination Status For Older Adults: Pneumococcal Vaccination Previously Received Detail Level: Detailed done

## 2024-09-09 ENCOUNTER — NON-APPOINTMENT (OUTPATIENT)
Age: 69
End: 2024-09-09

## 2025-07-31 NOTE — ASU PATIENT PROFILE, ADULT - PRO MENTAL HEALTH SX RECENT
Physical Therapy Initial Evaluation and Plan of Care    [address]  [address]  [place] [address]  [phone]    Patient: Kendra Perez Dr.   : 1977  Diagnosis/ICD-10 Code:  Decreased range of motion of left shoulder [M25.612]  Referring practitioner: Lana Nevarez PA-C  Date of Initial Visit: 2025  Today's Date: 2025  Patient seen for 1 sessions         Visit Diagnoses:    ICD-10-CM ICD-9-CM   1. Decreased range of motion of left shoulder  M25.612 719.51   2. Scapular dyskinesis  G25.89 781.3       Subjective Questionnaire: QuickDASH: 47.73     History of Present Illness  The patient presents for acute left shoulder pain.    She has a history of de Quervain's tenosynovitis, which required revision surgery due to suture migration on 2025. A few weeks prior to this, she experienced severe stabbing pain in her scapula, initially suspecting shingles due to the absence of a rash. The pain was so intense that it was triggered by deep breaths. She managed the pain with an aggressive massage, topical lidocaine, and hot packs, which alleviated the pain within 72 hours. She never developed a shingles rash and believes the pain may have been myofascial. A few days later, she began experiencing severe pain when raising her arm, particularly during exercise. The pain was so intense that it took her breath away. She has been minimizing the use of her left arm due to the pain, which also disrupts her sleep. She consulted an orthopedist postoperatively, who was satisfied with her progress. However, a physiotherapist in the [place] performed some guided exercises with her scapula, which allowed her to raise her arm without excruciating pain. Despite this, she still experiences significant pain, to the point where she can not open a window or turn the steering wheel while driving. The pain is primarily located on the backside of her shoulder blade and occasionally radiates to the  side or front of the shoulder. Certain movements exacerbate the pain, causing a shock-like sensation down her arms. She currently does not experience any paresthesias, but they do occur intermittently and are positional. She describes the current sensation as an ache and has stopped using her shoulder. She is right-handed and has been relying more on her right hand. Even simple tasks like taking off her clothes or shampooing her hair have become difficult. She avoids lifting heavy objects and tries to maintain a healthy lifestyle, including walking. She reports no neck injuries or surgeries and does not believe she has cervical radiculopathy symptoms. She tries to maintain good ergonomics and does not engage in strenuous activities. She carries a backpack to work but does not believe it worsens her condition. She does not experience any popping sounds from her shoulder. She rates her current pain as mild (2-3/10), but it increases to 5-6/10 when she attempts to lift objects or fix her hair. She has been avoiding these motions due to the pain. She reports no pain wrapping around the front of her rib cage. She has not tried dry needling but has found relief from massages. She notes that her pain tends to worsen by the end of the workday.    Pain Assessment:  - Pain level: 2-3/10 (mild), increases to 5-6/10 with lifting or fixing hair  - Laterality and location: Left shoulder, backside of the shoulder blade, occasionally radiates to the side or front of the shoulder  - Pain frequency: Intermittent  - Pain quality: Stabbing, shock-like sensation, ache  - Alleviating factors: Aggressive massage, topical lidocaine, hot packs  - Aggravating factors: Raising arm, exercise, certain movements    Functional Status/Activity Limitations:  - Difficulty performing activities of daily living such as taking off clothes and shampooing hair  - Avoids lifting heavy objects  - Discomfort while driving and turning the steering wheel  -  Relies more on the right hand due to pain in the left shoulder  - Pain disrupts sleep    PAST SURGICAL HISTORY:  - Revision surgery for de Quervain's tenosynovitis on 2025  - Bilateral de Quervain's during both pregnancies  - Antecubital fossa issues during pregnancies, managed with sleeves to keep arms straight at night  - Not currently using sleeves at night    SOCIAL HISTORY  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  Sports/Recreational Activities/Hobbies: Walks a lot, walks outside with kids in the evening  Person(s) Patient Resides with: , children    Patient Active Problem List    Diagnosis Date Noted    Asthma 2020    Hypothyroidism due to Hashimoto's thyroiditis 2020    Vitamin B12 deficiency 10/24/2019       Past Medical History:   Diagnosis Date    Allergies     Asthma     Goiter     Hashimoto's disease     Hypothyroidism     Vitamin B12 deficiency     Vitamin D deficiency       Past Surgical History:   Procedure Laterality Date     SECTION      x2    ENDOSCOPY      TENDON RELEASE Bilateral          Objective          Palpation   Left   Hypertonic in the levator scapulae and upper trapezius.   Tenderness of the levator scapulae, middle trapezius, rhomboids and upper trapezius.     Tenderness     Left Shoulder   Tenderness in the medial scapula.     Additional Tenderness Details  Superior angle scap L    Cervical/Thoracic Screen   Cervical range of motion within normal limits    Active Range of Motion   Left Shoulder   Flexion: 141 degrees   Abduction: 131 degrees with pain  External rotation 0°: 55 (taut) degrees   External rotation BTH: with pain  Internal rotation BTB: T12 with pain    Right Shoulder   Flexion: 180 degrees   Abduction: 180 degrees   External rotation BTH: T3 WFL  Internal rotation BTB: T5     Additional Active Range of Motion Details  From standing AROM- pain with abduction, ER most  With supine AROM: WFL with scapular assist    Scapular Mobility   Left  Shoulder   Scapular Mobility with Shoulder to 90° FF   Scapular winging: minimal    Scapular Mobility beyond 90° FF   Scapular winging: moderate    Right Shoulder   Scapular mobility: WFL    Strength/Myotome Testing     Left Shoulder     Planes of Motion   Flexion: 4   Abduction: 4-   External rotation at 0°: 4   Internal rotation at 0°: 4     Isolated Muscles   Biceps: 5     Right Shoulder   Normal muscle strength    Tests     Left Shoulder   Positive Hawkin's and Neer's.   Negative drop arm, empty can, full can, painful arc and Speed's.        Assessment & Plan       Assessment  Impairments: abnormal muscle firing, abnormal muscle tone, abnormal or restricted ROM, activity intolerance, impaired physical strength, lacks appropriate home exercise program and pain with function   Functional limitations: carrying objects, lifting, sleeping, pulling, pushing, uncomfortable because of pain, moving in bed, reaching behind back and reaching overhead   Prognosis: good    Goals  Plan Goals: Short Term Goals (2-3 wks)  1. Patient to report left shoulder pain less than or equal to 1-2/10.  2. Patient to demonstrate at least 150 degrees of AROM left shoulder flexion.  3. Patient will be independent and compliant with initial home exercise program.       Long Term Goals (4-6wks)  1. Patient to demonstrate at least 170 degrees of left shoulder flexion AROM.  2. Patient to demonstrate at least 160 degrees of left shoulder abduction AROM.  3. Patient to achieve left hand behind head to at least C7 without significant change in pain.  4. Pt will perform left hand behind back to at least L3 to improve ability to tuck in shirt.  5. Pt. will be independent and compliant with advanced home exercise program to facilitate self-management of symptoms.  6. Patient will improve Quick Dash score by 15 points to demonstrate improved function of daily tasks.        Plan  Therapy options: will be seen for skilled therapy services  Planned  modality interventions: cryotherapy, dry needling, TENS, thermotherapy (hydrocollator packs) and ultrasound  Planned therapy interventions: abdominal trunk stabilization, balance/weight-bearing training, body mechanics training, flexibility, functional ROM exercises, home exercise program, joint mobilization, manual therapy, neuromuscular re-education, postural training, soft tissue mobilization, spinal/joint mobilization, strengthening, stretching and therapeutic activities  Frequency: 1x week  Duration in visits: 6  Duration in weeks: 6  Treatment plan discussed with: patient        Assessment & Plan  1. Scapular dyskinesia.  Symptoms are consistent with scapular dyskinesia, characterized by abnormal movement of the shoulder blade leading to impingement-type pain during certain movements, like abduction and ER. This condition is often exacerbated by immobilization, which patient recently had a week plus of for KADERACHEL. The patient's range of motion improved significantly with mechanical stabilization of the shoulder blade, ruling out adhesive capsulitis. The goal is to strengthen the posterior cuff and scapular stabilizers to prevent forward rocking of the shoulder. She was advised to perform exercises such as counter slides, thumb up movements, and scapular retraction while sitting, focusing on engaging the lower and middle trapezius muscles rather than the upper trapezius. She was also advised to be mindful of her posture at work and to perform shoulder blade squeezes intermittently throughout the day. Dry needling was recommended as a potential treatment option for the levator scapulae and trapezius muscles.    Follow Up:  A follow-up appointment is scheduled for Tuesday at 8:30 AM. Pt needs to schedule for Fridays likely moving forward due to work/personal schedule. May transition care  to Dewayne Faria PT, YANIRAT.     History # of Personal factors and/or comorbidities: LOW (0)  Examination of Body System(s): # of  elements: LOW (1-2)  Clinical Presentation: STABLE   Clinical Decision Making: LOW       Timed:  Manual Therapy:    0     mins  01752;  Therapeutic Exercise:    12     mins  26133;     Neuromuscular Prashant:    0    mins  67945;    Therapeutic Activity:     0     mins  15857;     Gait Trainin     mins  20820;     Ultrasound:     0     mins  58127;    Ionto   __0_  mins  63275;  Self-care     10     mins 69259;    Un-Timed:  Electrical Stimulation:    0     mins  23585 ( );  Dry Needling     0     mins self-pay  Traction  _ 0_   mins  63018  Low Eval  __35_ mins  39263  Mod Eval  _0__ mins  16143  High Eval  _0__ mins   85372    Timed Treatment:   22   mins   Total Treatment:     57   mins    PT SIGNATURE: Corinne E Perkins, PT   [place] License: [id number]      Certification Period: 2025 thru 10/28/2025  I certify that the therapy services are furnished while this patient is under my care.  The services outlined above are required by this patient, and will be reviewed every 90 days.        Physician Signature: _______________________________________________________________________________________________________     PHYSICIAN: Lana Nevarez PA-C   NPI: [id number]     DATE:                                         Patient or patient representative verbalized consent for the use of Ambient Listening during the visit with  Corinne E Perkins, PT for chart documentation. 2025  12:59 EDT    Please sign and return via fax to .[employer] . Thank you, [facility].                  none

## (undated) DEVICE — DRAPE MICROSCOPE RESIGHT

## (undated) DEVICE — DIATHERMY PROBE 25GA

## (undated) DEVICE — MILOOP LENS MILOOP FRAGMENTATION DEVICE

## (undated) DEVICE — AUTO GAS FILL CONSTELLATION

## (undated) DEVICE — KNIFE ALCON SLIT INTREPID CLEAR-CUT SAFETY 2.4MM

## (undated) DEVICE — NUCLEUS HYDRODISSECTOR PEARCE ANGLED 25G X 22MM

## (undated) DEVICE — WARMING BLANKET LOWER ADULT

## (undated) DEVICE — KIT CENTURION ANTERIOR

## (undated) DEVICE — PACK ANTERIOR SEGMENT

## (undated) DEVICE — SOL IRR BAL SALT 500ML

## (undated) DEVICE — SUT SILK 2-0 12-18"

## (undated) DEVICE — DRAPE 1/2 SHEET 40X57"

## (undated) DEVICE — KNIFE ALCON PARACENTESIS CLEARCUT SIDEPORT 1MM (YELLOW)

## (undated) DEVICE — ELCTR BIPOLAR CORD BAUSCH & LOMB 12FT DISP

## (undated) DEVICE — SOL BALANCE SALT 15ML

## (undated) DEVICE — LENS VITRECTOMY FLAT

## (undated) DEVICE — BLADE AND TIP 66

## (undated) DEVICE — PACK CONSTELLATION POST 25G 20K

## (undated) DEVICE — SUT PLAIN GUT 6-0 18" TG140-8

## (undated) DEVICE — SUT ETHILON 5-0 18" RD-1

## (undated) DEVICE — BLADE AND TIP 57

## (undated) DEVICE — ILM FORCEP 25G

## (undated) DEVICE — DRAPE MICROSCOPE KNOB COVER SMALL (2 PCS)

## (undated) DEVICE — CANNULA ALCON SOFT TIP 25G

## (undated) DEVICE — ILM FORCEP 23G

## (undated) DEVICE — CANNULA ALCON SOFT TIP 23G

## (undated) DEVICE — GLV 7.5 PROTEXIS (WHITE)

## (undated) DEVICE — SUT NYLON 10-0 12" CU-5

## (undated) DEVICE — Device

## (undated) DEVICE — SOL IRR SALT BSS PLUS 500ML

## (undated) DEVICE — SUT VICRYL 8-0 12" TG140-8 DA

## (undated) DEVICE — DRSG TEGADERM 2.5X3"

## (undated) DEVICE — PACK VITRECTOMY

## (undated) DEVICE — SLEEVE INTREPID MICROSMOOTH ULTRA INFUSION 0.9MM (PINK)

## (undated) DEVICE — VENODYNE/SCD SLEEVE CALF MEDIUM

## (undated) DEVICE — PACK CENTURION 2.75MM

## (undated) DEVICE — TRANSFORMER INTREPID I/A 0.3MM